# Patient Record
Sex: FEMALE | Race: WHITE | NOT HISPANIC OR LATINO | Employment: OTHER | URBAN - NONMETROPOLITAN AREA
[De-identification: names, ages, dates, MRNs, and addresses within clinical notes are randomized per-mention and may not be internally consistent; named-entity substitution may affect disease eponyms.]

---

## 2019-12-26 ENCOUNTER — OFFICE VISIT (OUTPATIENT)
Dept: OTOLARYNGOLOGY | Facility: CLINIC | Age: 54
End: 2019-12-26

## 2019-12-26 VITALS — BODY MASS INDEX: 30.88 KG/M2 | WEIGHT: 167.8 LBS | HEIGHT: 62 IN | RESPIRATION RATE: 18 BRPM

## 2019-12-26 DIAGNOSIS — C44.319 BASAL CELL CARCINOMA (BCC) OF SKIN OF OTHER PART OF FACE: Primary | ICD-10-CM

## 2019-12-26 PROCEDURE — 99203 OFFICE O/P NEW LOW 30 MIN: CPT | Performed by: OTOLARYNGOLOGY

## 2019-12-26 RX ORDER — HYDROCHLOROTHIAZIDE 12.5 MG/1
TABLET ORAL
COMMUNITY
Start: 2019-10-24

## 2019-12-26 RX ORDER — DILTIAZEM HYDROCHLORIDE 180 MG/1
180 CAPSULE, COATED, EXTENDED RELEASE ORAL DAILY
COMMUNITY
Start: 2019-11-07

## 2019-12-29 NOTE — PROGRESS NOTES
Subjective   Evelyn Magallanes is a 54 y.o. female.     History of Present Illness   Patient reports she had a lesion on her left cheek that is been there for greater than 1 year.  She said she first thought it was just a wart.  But in the last few months it has begun to increase in size.  It was not bleeding or painful.  Underwent a biopsy back in November at Dr. Rodriguez's office that was reportedly consistent with basal cell carcinoma.  Dr. Rodriguez's notes are unfortunately not yet available for review but have been requested.  Reports that after the biopsy she can still tell she has a small red area present but it is not bleeding or painful.  Nothing in particular brought this on.       The following portions of the patient's history were reviewed and updated as appropriate: allergies, current medications, past family history, past medical history, past social history, past surgical history and problem list.      Evelyn Magallanes reports that she has never smoked. She has never used smokeless tobacco. She reports that she does not drink alcohol.  Patient is not a tobacco user and has not been counseled for use of tobacco products    Family History   Problem Relation Age of Onset   • Other Mother         Transient Ischemic Attack   • Diabetes Other    • Thyroid disease Other        No Known Allergies      Current Outpatient Medications:   •  citalopram (CeleXA) 20 MG tablet, Take 20 mg by mouth Daily., Disp: , Rfl:   •  dilTIAZem CD (CARDIZEM CD) 180 MG 24 hr capsule, Take 180 mg by mouth Daily., Disp: , Rfl:   •  hydroCHLOROthiazide (HYDRODIURIL) 12.5 MG tablet, TAKE 1 TABLET BY MOUTH ONCE DAILY, Disp: , Rfl:     Past Medical History:   Diagnosis Date   • Dysphagia    • Epigastric pain    • Gastritis    • High blood pressure    • Skin cancer    • Stricture of esophagus        Past Surgical History:   Procedure Laterality Date   • COLONOSCOPY  02/06/2015    Normal colonoscopy   • ENDOSCOPY  05/27/2016    Benign appearing  esophageal stenosis.Dilated.Normal stomach.Normal examined duodenum.No specimens collected   • ENDOSCOPY  02/06/2015    Esophageal stricture present. Dilatation performed. Esophagitis seen. Biospy taken. Gastritis in stoamch. Biopsy taken. Normal duodenum         Review of Systems   HENT: Positive for hearing loss.    Musculoskeletal: Positive for back pain.   All other systems reviewed and are negative.          Objective   Physical Exam  General: Well-developed well-nourished female in no acute distress.  Alert and oriented x-3. Head: Normocephalic. Face: Symmetrical strength and appearance. PERRL. EOMI. Voice:Strong. Speech:Fluent  Ears: External ears no deformity, canals no discharge, tympanic membranes intact clear and mobile bilaterally.  Nose: Nares show no discharge mass polyp or purulence.  Boggy mucosa is present.  No gross external deformity.  Septum: Midline  Oral cavity: Lips and gums without lesions.  Tongue and floor of mouth without lesions.  Parotid and submandibular ducts unobstructed.  No mucosal lesions on the buccal mucosa or vestibule of the mouth.  Pharynx: No erythema exudate mass or ulcer  Neck: No lymphadenopathy.  No thyromegaly.  Trachea and larynx midline.  No masses in the parotid or submandibular glands.  Skin: 8 mm erythematous lesion of the left cheek that is consistent with a biopsy site with some residual nodularity.        Assessment/Plan   Evelyn was seen today for skin lesion.    Diagnoses and all orders for this visit:    Basal cell carcinoma (BCC) of skin of other part of face      Plan: I have offered to perform excision of the skin lesion with frozen section margin control if indicated, and possible flap or skin graft if needed for closure.  I have explained the nature of this procedure to the patient in layman's terms including risks of bleeding, infection, poor healing, poor appearance, numbness, recurrence, and possible need for further treatment depending on final  pathology.  Proposed benefit would be definitive treatment of the identified lesions.  The alternative would be observation which could result in continued or recurrent growth of the lesion.  Patient voices understanding of all of the above and wishes to proceed.  This will be scheduled.  Dr. Rodriguez's notes will be obtained and reviewed prior to the procedure.

## 2020-01-28 ENCOUNTER — TELEPHONE (OUTPATIENT)
Dept: OTOLARYNGOLOGY | Facility: CLINIC | Age: 55
End: 2020-01-28

## 2020-01-28 NOTE — TELEPHONE ENCOUNTER
Patient called to cancel procedure.  Returned call to see if the patient wanted to reschedule;  No answer.  Left message for patient to call office.

## 2021-01-12 ENCOUNTER — TELEPHONE (OUTPATIENT)
Dept: OTOLARYNGOLOGY | Facility: CLINIC | Age: 56
End: 2021-01-12

## 2021-01-12 NOTE — TELEPHONE ENCOUNTER
No answer left message to call patient.  ----- Message from German Gamino MD sent at 1/11/2021  4:59 PM CST -----  Contact: [phone]  I need to see it again since it has been more than a year  ----- Message -----  From: Twyla Dent  Sent: 1/11/2021   4:06 PM CST  To: German Gamino MD    This is a bcc from a year ago, do you want me to go ahead and schedule a minor or bring them in to look first?  ----- Message -----  From: Lili Avitia  Sent: 1/11/2021   4:02 PM CST  To: German Gamino MD, Twyla Dent, #    Pt is ready to have minor procedure done. She canceled a while back because of covid

## 2021-01-14 ENCOUNTER — OFFICE VISIT (OUTPATIENT)
Dept: OTOLARYNGOLOGY | Facility: CLINIC | Age: 56
End: 2021-01-14

## 2021-01-14 VITALS — OXYGEN SATURATION: 97 % | BODY MASS INDEX: 33.34 KG/M2 | WEIGHT: 181.2 LBS | TEMPERATURE: 98.1 F | HEIGHT: 62 IN

## 2021-01-14 DIAGNOSIS — C44.319 BASAL CELL CARCINOMA (BCC) OF SKIN OF OTHER PART OF FACE: Primary | ICD-10-CM

## 2021-01-14 PROCEDURE — 99214 OFFICE O/P EST MOD 30 MIN: CPT | Performed by: OTOLARYNGOLOGY

## 2021-01-14 NOTE — PROGRESS NOTES
Subjective   Evelyn Magallanes is a 55 y.o. female.       History of Present Illness   Patient was seen previously with a biopsy-proven basal cell carcinoma of the left face.  Was seen in December 2019 and was offered excision.  Subsequently canceled and is just now getting back stating she is ready to have this taken care of.  Is not bleeding or painful.      The following portions of the patient's history were reviewed and updated as appropriate: allergies, current medications, past family history, past medical history, past social history, past surgical history and problem list.     reports that she has never smoked. She has never used smokeless tobacco. She reports that she does not drink alcohol.   Patient is not a tobacco user and has not been counseled for use of tobacco products      Review of Systems        Objective   Physical Exam  8 mm nodular lesion of the left cheek in the location that is indicated in both my notes and Dr. Rodriguez's notes (Dr. Rodriguez's notes are personally reviewed).    There is a smaller lesion inferior to this consistent with sebaceous hyperplasia that was also noted in Dr. Rodriguez's notes      Assessment/Plan   Diagnoses and all orders for this visit:    1. Basal cell carcinoma (BCC) of skin of other part of face (Primary)        Plan: I do think excision of the biopsy-proven basal cell carcinoma is indicated.  I have offered to perform excision of the skin lesion with frozen section margin control if indicated, and possible flap or skin graft if needed for closure.  I have explained the nature of this procedure to the patient in layman's terms including risks of bleeding, infection, poor healing, poor appearance, numbness, recurrence, and possible need for further treatment depending on final pathology.  Proposed benefit would be definitive treatment of the identified lesions.  The alternative would be observation which could result in continued or recurrent growth of the lesions.   Patient voices understanding of all of the above and wishes to proceed.  This will be scheduled.

## 2021-01-25 ENCOUNTER — PROCEDURE VISIT (OUTPATIENT)
Dept: OTOLARYNGOLOGY | Facility: CLINIC | Age: 56
End: 2021-01-25

## 2021-01-25 VITALS — OXYGEN SATURATION: 95 % | HEIGHT: 62 IN | WEIGHT: 181.6 LBS | TEMPERATURE: 98.3 F | BODY MASS INDEX: 33.42 KG/M2

## 2021-01-25 DIAGNOSIS — C44.319 BASAL CELL CARCINOMA (BCC) OF SKIN OF OTHER PART OF FACE: Primary | ICD-10-CM

## 2021-01-25 PROCEDURE — 12052 INTMD RPR FACE/MM 2.6-5.0 CM: CPT | Performed by: OTOLARYNGOLOGY

## 2021-01-25 PROCEDURE — 11641 EXC F/E/E/N/L MAL+MRG 0.6-1: CPT | Performed by: OTOLARYNGOLOGY

## 2021-01-25 RX ORDER — PANTOPRAZOLE SODIUM 20 MG/1
20 TABLET, DELAYED RELEASE ORAL DAILY
COMMUNITY
Start: 2021-01-12

## 2021-01-25 NOTE — PROGRESS NOTES
Procedure note    Preop diagnosis: Previously biopsied basal cell carcinoma left face    Postop diagnosis: Same    Procedure: Excision of previously biopsied basal cell carcinoma left face 0.9 cm with 2.7 cm layered closure    Surgeon: Dr. Gamino    Anesthesia: 2% Xylocaine with epinephrine    Description of procedure: After again identifying the previous biopsy site I once again explained the procedure to the patient including risks of bleeding, infection, poor healing, poor appearance, numbness, recurrence, and possible need for further treatment depending on final pathology.  Proposed benefit is definitive treatment of the biopsy-proven malignancy and the alternative is observation.  Patient voiced understanding and agreed to the procedure.  The skin around the lesion was cleansed with alcohol.  An elliptical incision was designed and infiltrated with 1% Xylocaine with epinephrine.  The face was then prepped with Zephiran and draped sterilely.  The lesion was excised sharply, full-thickness, marked with a suture for orientation, and sent to pathology for frozen section.  Frozen section returned basal cell carcinoma with clear margins per Dr. Carlson.  While awaiting frozen section the wound had been irrigated thoroughly with saline.  The skin edges were undermined and the subcutaneous tissues have been reapproximated with interrupted 5-0 Vicryl and the wound covered with a saline wet-to-dry dressing.  Upon receiving the frozen section results the wound was uncovered, additionally irrigated with saline, and the skin was closed with running 5-0 nylon.  Bacitracin ointment was applied.  Procedure was terminated.  Patient tolerated procedure well.  She was instructed in local wound care advised to return in 1 week for suture removal

## 2021-01-27 LAB
LAB AP CASE REPORT: NORMAL
PATH REPORT.FINAL DX SPEC: NORMAL

## 2021-02-01 ENCOUNTER — OFFICE VISIT (OUTPATIENT)
Dept: OTOLARYNGOLOGY | Facility: CLINIC | Age: 56
End: 2021-02-01

## 2021-02-01 VITALS — HEIGHT: 62 IN | BODY MASS INDEX: 33.31 KG/M2 | WEIGHT: 181 LBS

## 2021-02-01 DIAGNOSIS — Z48.817 AFTERCARE FOLLOWING SURGERY OF THE SKIN OR SUBCUTANEOUS TISSUE: Primary | ICD-10-CM

## 2021-02-01 PROCEDURE — 99024 POSTOP FOLLOW-UP VISIT: CPT | Performed by: OTOLARYNGOLOGY

## 2021-02-01 NOTE — PROGRESS NOTES
Subjective   Evelyn Magallanes is a 55 y.o. female.       History of Present Illness   Patient is 1 week status post excision of a basal cell carcinoma of the left face.  Final pathology showed clear margins.  Is having no specific complaints.      The following portions of the patient's history were reviewed and updated as appropriate: allergies, current medications, past family history, past medical history, past social history, past surgical history and problem list.     reports that she has never smoked. She has never used smokeless tobacco. She reports that she does not drink alcohol.   Patient is not a tobacco user and has not been counseled for use of tobacco products      Review of Systems        Objective   Physical Exam    Incision intact, no evidence of infection.  All external sutures are removed    Assessment/Plan   Diagnoses and all orders for this visit:    1. Aftercare following surgery of the skin or subcutaneous tissue (Primary)        Plan: Sutures removed as described above.  May follow-up with me as needed.